# Patient Record
Sex: FEMALE
[De-identification: names, ages, dates, MRNs, and addresses within clinical notes are randomized per-mention and may not be internally consistent; named-entity substitution may affect disease eponyms.]

---

## 2018-12-02 ENCOUNTER — HOSPITAL ENCOUNTER (EMERGENCY)
Dept: HOSPITAL 14 - H.ER | Age: 28
Discharge: HOME | End: 2018-12-02
Payer: SELF-PAY

## 2018-12-02 VITALS — DIASTOLIC BLOOD PRESSURE: 70 MMHG | TEMPERATURE: 98.2 F | HEART RATE: 76 BPM | SYSTOLIC BLOOD PRESSURE: 126 MMHG

## 2018-12-02 VITALS — RESPIRATION RATE: 16 BRPM | OXYGEN SATURATION: 98 %

## 2018-12-02 VITALS — BODY MASS INDEX: 36.6 KG/M2

## 2018-12-02 DIAGNOSIS — N39.0: ICD-10-CM

## 2018-12-02 DIAGNOSIS — N12: Primary | ICD-10-CM

## 2018-12-02 LAB
ALBUMIN SERPL-MCNC: 3.6 G/DL (ref 3.5–5)
ALBUMIN/GLOB SERPL: 1 {RATIO} (ref 1–2.1)
ALT SERPL-CCNC: 23 U/L (ref 9–52)
AST SERPL-CCNC: 20 U/L (ref 14–36)
BACTERIA #/AREA URNS HPF: (no result) /[HPF]
BASE EXCESS BLDV CALC-SCNC: 3.2 MMOL/L (ref 0–2)
BASOPHILS # BLD AUTO: 0.1 K/UL (ref 0–0.2)
BASOPHILS NFR BLD: 0.6 % (ref 0–2)
BILIRUB UR-MCNC: NEGATIVE MG/DL
BUN SERPL-MCNC: 10 MG/DL (ref 7–17)
CALCIUM SERPL-MCNC: 8.6 MG/DL (ref 8.4–10.2)
COLOR UR: (no result)
EOSINOPHIL # BLD AUTO: 0.2 K/UL (ref 0–0.7)
EOSINOPHIL NFR BLD: 1.9 % (ref 0–4)
ERYTHROCYTE [DISTWIDTH] IN BLOOD BY AUTOMATED COUNT: 13.5 % (ref 11.5–14.5)
GFR NON-AFRICAN AMERICAN: > 60
GLUCOSE UR STRIP-MCNC: (no result) MG/DL
HGB BLD-MCNC: 13 G/DL (ref 12–16)
LEUKOCYTE ESTERASE UR-ACNC: (no result) LEU/UL
LYMPHOCYTES # BLD AUTO: 2.8 K/UL (ref 1–4.3)
LYMPHOCYTES NFR BLD AUTO: 29.4 % (ref 20–40)
MCH RBC QN AUTO: 29.7 PG (ref 27–31)
MCHC RBC AUTO-ENTMCNC: 34.1 G/DL (ref 33–37)
MCV RBC AUTO: 87 FL (ref 81–99)
MONOCYTES # BLD: 0.6 K/UL (ref 0–0.8)
MONOCYTES NFR BLD: 6.3 % (ref 0–10)
NEUTROPHILS # BLD: 5.8 K/UL (ref 1.8–7)
NEUTROPHILS NFR BLD AUTO: 61.8 % (ref 50–75)
NRBC BLD AUTO-RTO: 0.1 % (ref 0–0)
PCO2 BLDV: 45 MMHG (ref 40–60)
PH BLDV: 7.41 [PH] (ref 7.32–7.43)
PH UR STRIP: 7 [PH] (ref 5–8)
PLATELET # BLD: 218 K/UL (ref 130–400)
PMV BLD AUTO: 9.3 FL (ref 7.2–11.7)
PROT UR STRIP-MCNC: NEGATIVE MG/DL
RBC # BLD AUTO: 4.39 MIL/UL (ref 3.8–5.2)
RBC # UR STRIP: NEGATIVE /UL
SP GR UR STRIP: 1.01 (ref 1–1.03)
SQUAMOUS EPITHIAL: 2 /HPF (ref 0–5)
URINE CLARITY: CLEAR
UROBILINOGEN UR-MCNC: 1 MG/DL (ref 0.2–1)
VENOUS BLOOD FIO2: 21 %
VENOUS BLOOD GAS PO2: 36 MM/HG (ref 30–55)
WBC # BLD AUTO: 9.4 K/UL (ref 4.8–10.8)

## 2018-12-02 PROCEDURE — 81003 URINALYSIS AUTO W/O SCOPE: CPT

## 2018-12-02 PROCEDURE — 80053 COMPREHEN METABOLIC PANEL: CPT

## 2018-12-02 PROCEDURE — 99284 EMERGENCY DEPT VISIT MOD MDM: CPT

## 2018-12-02 PROCEDURE — 87181 SC STD AGAR DILUTION PER AGT: CPT

## 2018-12-02 PROCEDURE — 87086 URINE CULTURE/COLONY COUNT: CPT

## 2018-12-02 PROCEDURE — 85025 COMPLETE CBC W/AUTO DIFF WBC: CPT

## 2018-12-02 PROCEDURE — 96361 HYDRATE IV INFUSION ADD-ON: CPT

## 2018-12-02 PROCEDURE — 96374 THER/PROPH/DIAG INJ IV PUSH: CPT

## 2018-12-02 PROCEDURE — 82803 BLOOD GASES ANY COMBINATION: CPT

## 2018-12-02 NOTE — ED PDOC
HPI: Female  Pain


Time Seen by Provider: 12/02/18 15:36


Chief Complaint (Nursing): Female Genitourinary


Chief Complaint (Provider): Female Genitourinary


History Per: Patient


History/Exam Limitations: no limitations


Onset/Duration Of Symptoms: Days (x1 month)


Current Symptoms Are (Timing): Still Present


Additional Complaint(s): 


29 y/o female presents to the ED for evaluation of mild pain with urination that

recently became severe, onset one month ago. Patient states the pain became 

severe yesterday and worsened with urination. Patient reports of urinating every

ten minutes, only a few drops at a time. Patient describes the pain as painful 

and burning. Patient states pain radiates to the the bilateral back and is 

associated with a subjective fever and chills. Patient states she has had UTI 

previously but reports this feels much worse. Patient additionally reports of 

taking pyridium with no relief of pain prescribed to her by her PMD. 





PMD: Dr. Whitman





Past Medical History


Reviewed: Historical Data, Nursing Documentation, Vital Signs


Vital Signs: 





                                Last Vital Signs











Temp  98.6 F   12/02/18 15:15


 


Pulse  87   12/02/18 15:15


 


Resp  16   12/02/18 15:15


 


BP  142/81   12/02/18 15:15


 


Pulse Ox  98   12/02/18 15:15














- Medical History


PMH: No Chronic Diseases





- Surgical History


Surgical History: No Surg Hx





- Family History


Family History: States: Unknown Family Hx





- Home Medications


Home Medications: 


                                Ambulatory Orders











 Medication  Instructions  Recorded


 


Miconazole Nitrate [Monistat 3] 1 each VG DAILY #1 kit 12/20/16


 


Nitrofurantoin Macrocrystals 100 mg PO BID #14 cap 12/20/16





[Macrobid]  


 


Sulfamethoxazole/Trimethoprim 1 tab PO BID #28 tab 12/02/18





[Bactrim  mg-160 mg]  














- Allergies


Allergies/Adverse Reactions: 


                                    Allergies











Allergy/AdvReac Type Severity Reaction Status Date / Time


 


No Known Allergies Allergy   Verified 12/20/16 09:40














Review of Systems


ROS Statement: Except As Marked, All Systems Reviewed And Found Negative


Constitutional: Positive for: Fever, Chills


Genitourinary Female: Positive for: Dysuria, Frequency





Physical Exam





- Reviewed


Nursing Documentation Reviewed: Yes


Vital Signs Reviewed: Yes





- Physical Exam


Appears: Positive for: Uncomfortable


Head Exam: Positive for: ATRAUMATIC, NORMOCEPHALIC


Skin: Positive for: Normal Color, Warm, Dry


Eye Exam: Positive for: Normal appearance, EOMI, PERRL


Neck: Positive for: Normal, Painless ROM


Cardiovascular/Chest: Positive for: Regular Rate, Rhythm.  Negative for: Murmur


Respiratory: Positive for: Normal Breath Sounds.  Negative for: Respiratory 

Distress


Gastrointestinal/Abdominal: Positive for: Soft, Tenderness (suprapubic 

tenderness)


Back: Positive for: L CVA Tenderness, R CVA Tenderness


Extremity: Positive for: Normal ROM.  Negative for: Deformity


Neurologic/Psych: Positive for: Alert, Oriented (x3).  Negative for: 

Motor/Sensory Deficits





- Laboratory Results


Result Diagrams: 


                                 12/02/18 16:10





                                 12/02/18 16:10





- ECG


O2 Sat by Pulse Oximetry: 98 (RA)


Pulse Ox Interpretation: Normal





Medical Decision Making


Medical Decision Making: 


Time: 1541


A/P: Workup for UTI vs pyelonephritis


-- IV fluids, labs with UA and Urine Culture ordered. 


-- Antibiotics given


-- Reassess patient. Patient most likely discharge home as patient is non-toxic 

appearing. 





-- VBG


-- CMP


-- CBC with Differentials


-- Sodium Chloride IV 1000 mls/hr


-- Toradol 30 mg IVP


-- Urine Culture


-- Urinalysis 





1814


Labs reviewed and show negative elevated WBC or lactate. UA positive for 

nitrate.


Based on symptoms with flank pain, will treat for pyelonephritis. First dose of 

Bactrim was given in the ED.


Patient is discharge with a prescription of Bactrim and instructed to follow up 

with PMD.





________________________________________________________________________________


Scribe Attestation:


Documented by George Moreno, acting as a scribe for Rachel Sosa MD.





Provider Scribe Attestation:


All medical record entries made by the Scribe were at my direction and 

personally dictated by me. I have reviewed the chart and agree that the record 

accurately reflects my personal performance of the history, physical exam, 

medical decision making, and the department course for this patient. I have also

personally directed, reviewed, and agree with the discharge instructions and 

disposition.





Disposition





- Clinical Impression


Clinical Impression: 


 Genitourinary Pain, UTI (urinary tract infection), Pyelonephritis








- Patient ED Disposition


Is Patient to be Admitted: No





- Disposition


Disposition: Routine/Home


Disposition Time: 18:14


Additional Instructions: 


Take medications as prescribed. Follow up with primary medical doctor.  Return 

to the emergency department if symptoms worsen or if new symptoms develop.


Prescriptions: 


Sulfamethoxazole/Trimethoprim [Bactrim  mg-160 mg] 1 tab PO BID #28 tab


Instructions:  Urinary Tract Infection, Adult (DC)


Forms:  [facility] (English)


Print Language: ENGLISH

## 2018-12-04 NOTE — ED PDOC
ED Additional Note





- Date & Time of Evaluation


Date of Evaluation: 12/04/18


Time of Evaluation: 13:45





- Physician Additional Note


Physician Additional Note: 


Spoke with patient re: Urine culture resuls. Pt discharged on Bactrim for UTI 

but resistant. Pt informed of resistant bacteria. States symptoms are slightly 

better. Script for Cipro 500mg PO BID x 5 days called into [facility] 

[phone]. Pt advised to return to ER if develops fever or worsening sx. Pt

has appointment in clinic on 12/17

## 2018-12-16 ENCOUNTER — HOSPITAL ENCOUNTER (EMERGENCY)
Dept: HOSPITAL 14 - H.ER | Age: 28
Discharge: HOME | End: 2018-12-16
Payer: SELF-PAY

## 2018-12-16 VITALS — BODY MASS INDEX: 36.6 KG/M2

## 2018-12-16 VITALS
OXYGEN SATURATION: 99 % | HEART RATE: 82 BPM | DIASTOLIC BLOOD PRESSURE: 84 MMHG | TEMPERATURE: 98 F | SYSTOLIC BLOOD PRESSURE: 138 MMHG

## 2018-12-16 VITALS — RESPIRATION RATE: 16 BRPM

## 2018-12-16 DIAGNOSIS — N20.0: ICD-10-CM

## 2018-12-16 DIAGNOSIS — R11.0: ICD-10-CM

## 2018-12-16 DIAGNOSIS — N12: Primary | ICD-10-CM

## 2018-12-16 LAB
ALBUMIN SERPL-MCNC: 4.1 G/DL (ref 3.5–5)
ALBUMIN/GLOB SERPL: 1.2 {RATIO} (ref 1–2.1)
ALT SERPL-CCNC: 29 U/L (ref 9–52)
AST SERPL-CCNC: 26 U/L (ref 14–36)
BACTERIA #/AREA URNS HPF: (no result) /[HPF]
BASE EXCESS BLDV CALC-SCNC: -0.9 MMOL/L (ref 0–2)
BASOPHILS # BLD AUTO: 0 K/UL (ref 0–0.2)
BASOPHILS NFR BLD: 0.5 % (ref 0–2)
BILIRUB UR-MCNC: NEGATIVE MG/DL
BUN SERPL-MCNC: 9 MG/DL (ref 7–17)
CALCIUM SERPL-MCNC: 9 MG/DL (ref 8.4–10.2)
COLOR UR: YELLOW
EOSINOPHIL # BLD AUTO: 0.2 K/UL (ref 0–0.7)
EOSINOPHIL NFR BLD: 2.6 % (ref 0–4)
ERYTHROCYTE [DISTWIDTH] IN BLOOD BY AUTOMATED COUNT: 13.3 % (ref 11.5–14.5)
GFR NON-AFRICAN AMERICAN: > 60
GLUCOSE UR STRIP-MCNC: (no result) MG/DL
HGB BLD-MCNC: 14.1 G/DL (ref 12–16)
LEUKOCYTE ESTERASE UR-ACNC: (no result) LEU/UL
LYMPHOCYTES # BLD AUTO: 2.2 K/UL (ref 1–4.3)
LYMPHOCYTES NFR BLD AUTO: 29.9 % (ref 20–40)
MCH RBC QN AUTO: 29.3 PG (ref 27–31)
MCHC RBC AUTO-ENTMCNC: 33.9 G/DL (ref 33–37)
MCV RBC AUTO: 86.4 FL (ref 81–99)
MONOCYTES # BLD: 0.4 K/UL (ref 0–0.8)
MONOCYTES NFR BLD: 5.2 % (ref 0–10)
NEUTROPHILS # BLD: 4.5 K/UL (ref 1.8–7)
NEUTROPHILS NFR BLD AUTO: 61.8 % (ref 50–75)
NRBC BLD AUTO-RTO: 0.1 % (ref 0–0)
PCO2 BLDV: 28 MMHG (ref 40–60)
PH BLDV: 7.49 [PH] (ref 7.32–7.43)
PH UR STRIP: 6 [PH] (ref 5–8)
PLATELET # BLD: 233 K/UL (ref 130–400)
PMV BLD AUTO: 9.3 FL (ref 7.2–11.7)
PROT UR STRIP-MCNC: 30 MG/DL
RBC # BLD AUTO: 4.82 MIL/UL (ref 3.8–5.2)
RBC # UR STRIP: (no result) /UL
SP GR UR STRIP: 1.02 (ref 1–1.03)
SQUAMOUS EPITHIAL: 24 /HPF (ref 0–5)
URINE CLARITY: (no result)
UROBILINOGEN UR-MCNC: (no result) MG/DL (ref 0.2–1)
VENOUS BLOOD FIO2: 21 %
VENOUS BLOOD GAS PO2: 58 MM/HG (ref 30–55)
WBC # BLD AUTO: 7.2 K/UL (ref 4.8–10.8)

## 2018-12-16 PROCEDURE — 96374 THER/PROPH/DIAG INJ IV PUSH: CPT

## 2018-12-16 PROCEDURE — 81003 URINALYSIS AUTO W/O SCOPE: CPT

## 2018-12-16 PROCEDURE — 99284 EMERGENCY DEPT VISIT MOD MDM: CPT

## 2018-12-16 PROCEDURE — 87491 CHLMYD TRACH DNA AMP PROBE: CPT

## 2018-12-16 PROCEDURE — 87040 BLOOD CULTURE FOR BACTERIA: CPT

## 2018-12-16 PROCEDURE — 82803 BLOOD GASES ANY COMBINATION: CPT

## 2018-12-16 PROCEDURE — 85025 COMPLETE CBC W/AUTO DIFF WBC: CPT

## 2018-12-16 PROCEDURE — 87086 URINE CULTURE/COLONY COUNT: CPT

## 2018-12-16 PROCEDURE — 96375 TX/PRO/DX INJ NEW DRUG ADDON: CPT

## 2018-12-16 PROCEDURE — 83690 ASSAY OF LIPASE: CPT

## 2018-12-16 PROCEDURE — 74176 CT ABD & PELVIS W/O CONTRAST: CPT

## 2018-12-16 PROCEDURE — 81025 URINE PREGNANCY TEST: CPT

## 2018-12-16 PROCEDURE — 87591 N.GONORRHOEAE DNA AMP PROB: CPT

## 2018-12-16 PROCEDURE — 80053 COMPREHEN METABOLIC PANEL: CPT

## 2018-12-16 NOTE — ED PDOC
HPI: Female  Pain


Time Seen by Provider: 12/16/18 15:50


Chief Complaint (Nursing): Female Genitourinary


Chief Complaint (Provider): Left flank pain


History Per: Patient


History/Exam Limitations: no limitations


Onset/Duration Of Symptoms: Days


Current Symptoms Are (Timing): Still Present


Quality Of Discomfort: "Pain"


Associated Symptoms: Urinary Symptoms.  denies: Fever, Chills, Vomiting


Additional Complaint(s): 


28 year old female presents to the ED for an evaluation of left flank pain and 

urinary symptoms. Patient had a history of UTI in the past and was seen in the 

ED where she was prescribed Cipro 500mg for 1 week for UTI as noted. She reports

of painful urination. Otherwise, patient denies vomiting, fever or chills. 


PMD: [facility] 








Past Medical History


Reviewed: Historical Data, Nursing Documentation, Vital Signs


Vital Signs: 





                                Last Vital Signs











Temp  98.3 F   12/16/18 15:34


 


Pulse  80   12/16/18 15:34


 


Resp  16   12/16/18 15:34


 


BP  158/91 H  12/16/18 15:34


 


Pulse Ox  98   12/16/18 15:34














- Medical History


PMH: No Chronic Diseases





- Family History


Family History: States: Unknown Family Hx





- Social History


Current smoker - smoking cessation education provided: No


Alcohol: None


Drugs: Denies





- Home Medications


Home Medications: 


                                Ambulatory Orders











 Medication  Instructions  Recorded


 


Miconazole Nitrate [Monistat 3] 1 each VG DAILY #1 kit 12/20/16


 


Nitrofurantoin Macrocrystals 100 mg PO BID #14 cap 12/20/16





[Macrobid]  


 


Sulfamethoxazole/Trimethoprim 1 tab PO BID #28 tab 12/02/18





[Bactrim  mg-160 mg]  


 


Ciprofloxacin HCl [Cipro] 500 mg PO BID #14 tablet 12/16/18


 


Naproxen 375 mg PO Q8 PRN #21 tablet 12/16/18














- Allergies


Allergies/Adverse Reactions: 


                                    Allergies











Allergy/AdvReac Type Severity Reaction Status Date / Time


 


No Known Allergies Allergy   Verified 12/20/16 09:40














Review of Systems


ROS Statement: Except As Marked, All Systems Reviewed And Found Negative


Constitutional: Negative for: Fever, Chills


Gastrointestinal: Negative for: Vomiting


Genitourinary Female: Positive for: Dysuria, Frequency, Other (UTI in the past)


Musculoskeletal: Positive for: Back Pain (left flank)





Physical Exam





- Reviewed


Nursing Documentation Reviewed: Yes


Vital Signs Reviewed: Yes





- Physical Exam


Appears: Positive for: Non-toxic, No Acute Distress


Head Exam: Positive for: ATRAUMATIC, NORMAL INSPECTION, NORMOCEPHALIC


Skin: Positive for: Normal Color, Warm, Dry.  Negative for: Rash


Eye Exam: Positive for: Normal appearance


Gastrointestinal/Abdominal: Positive for: Soft, Tenderness (mild epigastric )


Back: Negative for: Normal Inspection (left flank pain)


Neurologic/Psych: Positive for: Alert, Oriented (x3).  Negative for: 

Motor/Sensory Deficits





- Laboratory Results


Result Diagrams: 


                                 12/16/18 16:50





                                 12/16/18 16:50





- ECG


O2 Sat by Pulse Oximetry: 98 (RA)


Pulse Ox Interpretation: Normal





- Progress


ED Course And Treament: 


Patient's older records reviewed which present Ecoli is sensitive to Macobid, 

Cipro and Zosyn 








seen by family med resident


d/w Dr. Yarbrough.  patient to be d/c home with cipro and f/u with clinic 

tomorrow.





Medical Decision Making


Medical Decision Making: 


Time: 1545


Initial Plan:


Venous Blood Gas Shock 


CMP


CBC w/ Differential 


Chlamydia/GC RNA, TMA


Normal Saline 500mls/hr


Toradol 15mg 


Zosyn 3.375gm Sodium Chloride 0.9% 100ml   


Zofran Injection 


Blood culture 


Urine culture 


Urinalysis 


Reevaluation 





1858


CT Abd/Pelvis w/o contrast


FINDINGS: 


LUNG BASES: 


The lung bases appear clear. No pleural effusions are seen. 


LIVER: 


Unremarkable. 


GALLBLADDER AND BILE DUCTS: 


The gallbladder appears within normal limits. No radioopaque gallstones are 

seen. No biliary ductal dilatation is evident. 


PANCREAS: 


Unremarkable. 


SPLEEN: 


Unremarkable. 


ADRENAL GLANDS: 


Unremarkable. 


KIDNEYS, URETERS, AND BLADDER: 


There are multiple nonobstructing left renal calculi 2 of the larger calculi 

measuring 9 mm each.  There is no hydronephrosis or hydroureter. 


STOMACH AND BOWEL: 


Unremarkable appearance of the stomach and bowel. No evidence of bowel obstructi

on. No evidence suggesting enteritis or colitis. 


APPENDIX: 


No evidence of acute appendicitis on CT examination. 


PERITONEUM: 


No free fluid. No free air. 


LYMPH NODES: 


No lymphadenopathy is evident. 


VASCULATURE: 


No evidence of abdominal aortic aneurysm. 


BONES: 


No aggressive appearing osseous lesion. No acute osseous pathology evident. 


IMPRESSION: 


Multiple nonobstructing left renal calculi largest measuring 9 mm in dimension. 

 There are no suspicious mass or lymphadenopathy or free fluid collection.  If 

symptoms persist correlation with a dedicated contrast-enhanced study 

recommended.





--------------------------------------------------------------------------

----------------------- 


Scribe Attestation:


Documented by Juan Thakkar, acting as a scribe for Roxie Sousa PA-C.





Provider Scribe Attestation:


All medical record entries made by the Scribe were at my direction and 

personally dictated by me. I have reviewed the chart and agree that the record 

accurately reflects my personal performance of the history, physical exam, 

medical decision making, and the department course for this patient. I have also

 personally directed, reviewed, and agree with the discharge instructions and 

disposition.





Disposition





- Clinical Impression


Clinical Impression: 


 Pyelonephritis








- Patient ED Disposition


Is Patient to be Admitted: No





- Disposition


Disposition: Routine/Home


Disposition Time: 20:20


Condition: FAIR


Prescriptions: 


Ciprofloxacin HCl [Cipro] 500 mg PO BID #14 tablet


Naproxen 375 mg PO Q8 PRN #21 tablet


 PRN Reason: Pain, Moderate (4-7)


Instructions:  Kidney Infection (DC)


Forms:  [facility] ED School/Work Excuse

## 2018-12-17 NOTE — CT
Date of service: 



12/16/2018



PROCEDURE:  CT Abdomen and Pelvis without intravenous contrast



HISTORY:

left flank pain



COMPARISON:

None.



TECHNIQUE:

Technique. 



Contrast dose: 



Radiation dose:



Total exam DLP = 660.25 mGy-cm.



This CT exam was performed using one or more of the following dose 

reduction techniques: Automated exposure control, adjustment of the 

mA and/or kV according to patient size, and/or use of iterative 

reconstruction technique.



FINDINGS:



LOWER THORAX:

Unremarkable. 



LIVER:

Unremarkable. No gross lesion or ductal dilatation.  



GALLBLADDER AND BILE DUCTS:

Unremarkable. 



PANCREAS:

Unremarkable. No gross lesion or ductal dilatation.



SPLEEN:

Unremarkable. 



ADRENALS:

Unremarkable. No mass. 



KIDNEYS AND URETERS:

Left nephrolithiasis measuring up to 9 millimeters.  No obstruction. 



VASCULATURE:

Unremarkable. No aortic aneurysm. No aortic atherosclerotic 

calcification or mural plaque present.



BOWEL:

Unremarkable. No obstruction. No gross mural thickening. 



APPENDIX:

Unremarkable. Normal appendix. 



PERITONEUM:

Unremarkable. No free fluid. No free air. 



LYMPH NODES:

Unremarkable. No enlarged lymph nodes. 



BLADDER:

Unremarkable. 



REPRODUCTIVE:

Unremarkable. 



BONES:

No acute fracture. 



OTHER FINDINGS:

None.



IMPRESSION:

Left nephrolithiasis measuring up to 9 millimeters. No obstruction.